# Patient Record
Sex: MALE | Race: ASIAN | NOT HISPANIC OR LATINO | URBAN - METROPOLITAN AREA
[De-identification: names, ages, dates, MRNs, and addresses within clinical notes are randomized per-mention and may not be internally consistent; named-entity substitution may affect disease eponyms.]

---

## 2021-05-24 ENCOUNTER — IMMUNIZATION (OUTPATIENT)
Dept: FAMILY MEDICINE | Facility: CLINIC | Age: 31
End: 2021-05-24
Payer: COMMERCIAL

## 2021-05-24 PROCEDURE — 0011A PR COVID VAC MODERNA 100 MCG/0.5 ML IM: CPT

## 2021-05-24 PROCEDURE — 91301 PR COVID VAC MODERNA 100 MCG/0.5 ML IM: CPT

## 2021-06-19 ENCOUNTER — HOSPITAL ENCOUNTER (EMERGENCY)
Dept: EMERGENCY MEDICINE | Facility: HOSPITAL | Age: 31
Discharge: HOME | End: 2021-06-19
Admitting: STUDENT IN AN ORGANIZED HEALTH CARE EDUCATION/TRAINING PROGRAM
Payer: COMMERCIAL

## 2021-06-19 DIAGNOSIS — L24.7 IRRITANT CONTACT DERMATITIS DUE TO PLANTS, EXCEPT FOOD: ICD-10-CM

## 2021-06-19 RX ORDER — CALAMINE 8% AND ZINC OXIDE 8% 160 MG/ML
LOTION TOPICAL
Qty: 120 ML | Refills: 0 | Status: SHIPPED | OUTPATIENT
Start: 2021-06-19 | End: 2023-04-06

## 2021-06-19 ASSESSMENT — MIFFLIN-ST. JEOR: SCORE: 1452.49

## 2021-06-26 NOTE — ED PROVIDER NOTES
Emergency Department Encounter   NAME: Harry Hewitt ; AGE: 31 y.o. male ; YOB: 1990 ; MRN: [id number] ; EVALUATION DATE & TIME: 6/19/2021 12:02 PM ; PCP: Provider, No Primary Care   ED PROVIDER: Erinn Owusu PA-C    Chief Complaint   Patient presents with     Rash       Medical Decision Making & Final Diagnosis     1. Irritant contact dermatitis due to plants, except food       Harry is a 32 yo previously healthy M who presents the ED for rash.  First noticed symptoms on Thursday.  Possible exposure with outdoor activities on Wednesday as he was cutting down things with his brother and wiping his face and chest with sweat.  Rash is itchy.    I considered multiple diagnoses including: Anaphylaxis, allergic reaction, poison oak, poison ivy, SJS or dress reaction, contact dermatitis, cellulitis, Ludewig's angina, and others    Exam: Notable for normal vital signs in a nontoxic appearing man.  Diffusely erythematous vesiculopapular rash to face, anterior neck, and chest.  There are 2 small areas on bilateral hands.  Periorbital facial edema also noted.  No intraoral or perioral edema.  No trismus.  Floor the mouth is supple.  No submandibular or submental edema or induration.  Clear lungs.  No stridor.  Impression: Contact dermatitis likely secondary to poison oak or poison ivy; patient presents with symptoms most consistent with a contact dermatitis from poison oak or poison ivy.  He was endorsing some difficulty breathing during the swelling in his neck but clinically he is not exhibiting any symptoms of this.  No stridor.  Posterior oropharynx without any abnormality, tonsillar hypertrophy, or tongue swelling.  Floor the mouth is soft.  No submental or submandibular swelling.  Skin is tense and edematous over the anterior portion of the neck but this is not extend to deeper tissues.  It is not circumferential.  It is noted on the monitor that the patient has been tachypneic several times while in the  emergency department I have evaluated him a few times while the monitor was reading this and he was resting and sleeping comfortably.  He had no wheezing breath noises.  He is not endorsing any other systemic symptoms suspicious for anaphylaxis.  He is not hypotensive or tachycardic here.   No indication for epinephrine.  Plan for Benadryl and calamine lotion to help with itching.  Steroids for swelling.  Unfortunately main pharmacy did not deliver the steroids for over an hour.  I followed up on this and discussed with pharmacist and primary nurse.  After patient received interventions he notes significant symptomatic improvement.  Upon my reevaluation his swelling and erythema has significantly decreased.  I do think he is appropriate for outpatient management at this point.  Nothing on exam to suggest cellulitis at this time.  No fever or pain with TTP.  No purulent drainage.  No systemic symptoms noted.  Low suspicion of just an allergic reaction.  Most consistent with contact dermatitis secondary to plant versus urticaria.  No new medications and exam not consistent with SJS or DRESS drug reaction.  No other systemic symptoms to suggest other etiology.  Plan: I do think this patient is appropriate for outpatient management follow-up with primary care provider.  He doesn't have a primary care provider so I referred him to wanting given information to follow-up.  I went over the results of the exam at the bedside.  Patient was given instructions for symptomatic care for home with steroids, calamine lotion, wound protection and infection prevention. We reviewed strict return precautions that would merit further evaluation in the emergency department.  Discussed the provisional nature of the diagnosis and need for close follow-up with pcp. We reviewed this could be contagious.  Patient was given adequate time to ask questions, received appropriate answers, and agrees with the plan as written.      ED Course   12:15  PM I met and introduced myself to the patient. I gathered initial history and performed my physical exam. We discussed plan for initial workup. Proper PPE worn while evaluating the patient in the room including N95 mask, glasses, and surgical cap.   1:30 PM rechecked the patient at the bedside.  Unfortunately, main pharmacy has not sent up any steroids more than an hour after they were ordered.  Benadryl and famotidine has been administered.  He is sleeping and resting comfortably at the bedside.  Monitor notes tachypnea but clinically he is in no evidence of respiratory distress or breathing quickly.  He notes slight improvement in symptoms.  I followed up with pharmacy to ensure the patient gets Solu-Medrol in a timely manner.  2:45 PM Pt significantly improved clinically.  He also reports improvement.  We discussed strict return precautions and management for home.    MEDICATIONS GIVEN IN THE EMERGENCY:   Medications   sodium chloride flush 10 mL (NS) (has no administration in time range)   diphenhydrAMINE injection 50 mg (BENADRYL) (50 mg Intravenous Given 6/19/21 1237)   famotidine 20 mg injection (20 mg Intravenous Given 6/19/21 1237)   methylPREDNISolone sod suc(PF) injection 80 mg (SOLU-MEDROL) (80 mg Intravenous Given 6/19/21 1338)   bacitracin ointment packet 1 packet (1 packet Topical Given 6/19/21 1450)   bacitracin ointment packet 1 packet (1 packet Topical Given 6/19/21 1450)   bacitracin ointment packet 1 packet (1 packet Topical Given 6/19/21 1450)      NEW PRESCRIPTIONS STARTED AT TODAY'S ER VISIT:  Discharge Medication List as of 6/19/2021  2:50 PM      START taking these medications    Details   calamine-zinc oxide topical lotion Apply topically for itching relief, Print      predniSONE (DELTASONE) 20 MG tablet Take 40 mg by mouth daily for 5 days., Starting Sat 6/19/2021, Until Thu 6/24/2021, Print            =================================================================   History   Patient  information was obtained from: patient   Use of Intrepreter: Yes (Phone) - Language Lalit Hewitt is a 31 y.o. male with no relevant PMH, who presents to the ED for evaluation of a rash.     Patient notes that he was helping his brother cut trees on Thursday 6/17/21 when he wiped his face and chest with his shirt and began developing a rash. The rash started on his face and stomach but has since radiated to his chest, arms, anterior and posterior neck. He states Thursday it was itchy but Friday (6/18/21) his body began to get swollen and the rash spread. He reports the itchy sensation is difficult to tolerate. He denies taking any medication but has tried a herbal remedy which he states helped initially but no longer helps. The last use was this morning. He denies any pain at this time. He states that due to the swollen area on his chest, he is having some difficulty breathing and a cough. He denies chest pain, nausea, vomiting, urinary symptoms, light-headedness, or any other complaints at this time.    Patient notes he has never had anything similar in the past or known allergies. He also denies any changes in diet, lotions, clothes, or any other changes.     ______________________________________________________________________  Relevant past medical history reviewed with patient, unless otherwise stated in HPI, history not pertinent to this visit.    Relevant past surgical history reviewed with patient, unless otherwise stated in HPI, history not pertinent to this visit.    Relevant family history reviewed with patient, unless otherwise stated in HPI, history not pertinent to this visit.    Social History     Tobacco Use     Smoking status: Not on file   Substance Use Topics     Alcohol use: Not on file     Drug use: Not on file       REVIEW OF SYSTEMS:    Review of Systems   Constitutional: Negative for chills and fever.   HENT: Negative for sore throat.    Eyes: Negative for visual disturbance.  "  Respiratory: Positive for cough. Negative for shortness of breath.         Positive for difficulty breathing   Cardiovascular: Negative for chest pain and leg swelling.   Gastrointestinal: Negative for abdominal pain, blood in stool, diarrhea, nausea and vomiting.   Genitourinary: Negative for discharge, dysuria and hematuria.   Skin: Positive for rash (Face, neck, abdomen, and bilateral arms. ).   Neurological: Negative for weakness, light-headedness and headaches.   All other systems reviewed and are negative.        Physical Exam   /65   Pulse (!) 54   Temp 97.9  F (36.6  C) (Oral)   Resp (!) 30   Ht 5' 3\" (1.6 m)   Wt 135 lb (61.2 kg)   SpO2 100%   BMI 23.91 kg/m      Physical Exam   Constitutional: He appears well-developed and well-nourished. No distress.   nontoxic appearing man.   HENT:   Head: Normocephalic and atraumatic.   Diffusely erythematous vesiculopapular rash to face, anterior neck, and chest.  There are 2 small areas on bilateral hands.  Periorbital facial edema also noted.  No intraoral or perioral edema.  No trismus.  Floor the mouth is supple.  No submandibular or submental edema or induration.   Eyes: Conjunctivae are normal.   Neck: Normal range of motion. Neck supple.   Cardiovascular: Normal rate, regular rhythm, normal heart sounds and intact distal pulses.   Pulmonary/Chest: Effort normal and breath sounds normal. No respiratory distress. He has no wheezes. He has no rales.   Clear lungs. No stridor.   Abdominal: Soft. He exhibits no distension. There is no abdominal tenderness. There is no rebound and no guarding.   Musculoskeletal: Normal range of motion.         General: No deformity or edema (bilateral lower extremities).   Neurological: He is alert.   Skin: Skin is warm and dry.   Psychiatric: He has a normal mood and affect. Judgment normal.   Nursing note and vitals reviewed.        Yumiko SHARMA, am serving as a scribe to document services personally performed by " Erinn Owusu PA-C, based on my observation and the provider's statements to me. I, Erinn Owusu PA-C attest that Yumiko Elizabeth is acting in a scribe capacity, has observed my performance of the services and has documented them in accordance with my direction.     Erinn Owusu PA-C   Emergency Medicine   [facility] EMERGENCY DEPARTMENT  [address]  [facility] [address]  Dept: [phone]  Loc: [phone]      Erinn Owusu PA-C  06/19/21 5719

## 2021-06-28 ENCOUNTER — IMMUNIZATION (OUTPATIENT)
Dept: FAMILY MEDICINE | Facility: CLINIC | Age: 31
End: 2021-06-28
Attending: FAMILY MEDICINE
Payer: COMMERCIAL

## 2021-06-28 PROCEDURE — 91301 PR COVID VAC MODERNA 100 MCG/0.5 ML IM: CPT

## 2021-06-28 PROCEDURE — 0012A PR COVID VAC MODERNA 100 MCG/0.5 ML IM: CPT

## 2021-07-06 VITALS — BODY MASS INDEX: 23.92 KG/M2 | WEIGHT: 135 LBS | HEIGHT: 63 IN

## 2023-01-13 ENCOUNTER — IMMUNIZATION (OUTPATIENT)
Dept: FAMILY MEDICINE | Facility: CLINIC | Age: 33
End: 2023-01-13
Payer: COMMERCIAL

## 2023-01-13 PROCEDURE — 91312 COVID-19 VACCINE BIVALENT BOOSTER 12+ (PFIZER): CPT

## 2023-01-13 PROCEDURE — 0124A COVID-19 VACCINE BIVALENT BOOSTER 12+ (PFIZER): CPT

## 2023-01-13 PROCEDURE — 99207 PR NO CHARGE LOS: CPT

## 2023-03-24 ENCOUNTER — OFFICE VISIT (OUTPATIENT)
Dept: FAMILY MEDICINE | Facility: CLINIC | Age: 33
End: 2023-03-24
Payer: COMMERCIAL

## 2023-03-24 VITALS
HEIGHT: 64 IN | TEMPERATURE: 98 F | HEART RATE: 77 BPM | BODY MASS INDEX: 21 KG/M2 | RESPIRATION RATE: 16 BRPM | OXYGEN SATURATION: 100 % | SYSTOLIC BLOOD PRESSURE: 100 MMHG | WEIGHT: 123 LBS | DIASTOLIC BLOOD PRESSURE: 67 MMHG

## 2023-03-24 DIAGNOSIS — R63.4 WEIGHT LOSS: Primary | ICD-10-CM

## 2023-03-24 LAB — HBA1C MFR BLD: 5.6 % (ref 0–5.6)

## 2023-03-24 PROCEDURE — 80053 COMPREHEN METABOLIC PANEL: CPT | Performed by: STUDENT IN AN ORGANIZED HEALTH CARE EDUCATION/TRAINING PROGRAM

## 2023-03-24 PROCEDURE — 99204 OFFICE O/P NEW MOD 45 MIN: CPT | Mod: GC | Performed by: STUDENT IN AN ORGANIZED HEALTH CARE EDUCATION/TRAINING PROGRAM

## 2023-03-24 PROCEDURE — 80061 LIPID PANEL: CPT | Performed by: STUDENT IN AN ORGANIZED HEALTH CARE EDUCATION/TRAINING PROGRAM

## 2023-03-24 PROCEDURE — 83036 HEMOGLOBIN GLYCOSYLATED A1C: CPT | Performed by: STUDENT IN AN ORGANIZED HEALTH CARE EDUCATION/TRAINING PROGRAM

## 2023-03-24 PROCEDURE — 36415 COLL VENOUS BLD VENIPUNCTURE: CPT | Performed by: STUDENT IN AN ORGANIZED HEALTH CARE EDUCATION/TRAINING PROGRAM

## 2023-03-24 PROCEDURE — 86803 HEPATITIS C AB TEST: CPT | Performed by: STUDENT IN AN ORGANIZED HEALTH CARE EDUCATION/TRAINING PROGRAM

## 2023-03-24 PROCEDURE — 87389 HIV-1 AG W/HIV-1&-2 AB AG IA: CPT | Performed by: STUDENT IN AN ORGANIZED HEALTH CARE EDUCATION/TRAINING PROGRAM

## 2023-03-24 PROCEDURE — 84443 ASSAY THYROID STIM HORMONE: CPT | Performed by: STUDENT IN AN ORGANIZED HEALTH CARE EDUCATION/TRAINING PROGRAM

## 2023-03-24 NOTE — PATIENT INSTRUCTIONS
Try eating more protein     We checked some labs.  Dr. Mace will review the abnormal labs with you.     Your weight is healthy! Keep up the healthy diet!

## 2023-03-24 NOTE — LETTER
March 31, 2023      Harryanurag Hewitt  [address] [place] [address]        Dear ,    We are writing to inform you of your test results.    Dear Harry,     These are the results of your blood work, which are all within normal limits.  As we discussed at our clinic visit, I am sending them for your records.  You have an appointment with your primary care doctor on April 6.  If you have any questions at that time, please bring them to that appointment.     Sincerely,   Dr. Jc       Resulted Orders   TSH with free T4 reflex   Result Value Ref Range    TSH 1.27 0.30 - 4.20 uIU/mL   Comprehensive metabolic panel   Result Value Ref Range    Sodium 140 136 - 145 mmol/L    Potassium 4.2 3.4 - 5.3 mmol/L    Chloride 104 98 - 107 mmol/L    Carbon Dioxide (CO2) 21 (L) 22 - 29 mmol/L    Anion Gap 15 7 - 15 mmol/L    Urea Nitrogen 18.1 6.0 - 20.0 mg/dL    Creatinine 0.92 0.67 - 1.17 mg/dL    Calcium 8.8 8.6 - 10.0 mg/dL    Glucose 102 (H) 70 - 99 mg/dL    Alkaline Phosphatase 69 40 - 129 U/L    AST 34 10 - 50 U/L    ALT 34 10 - 50 U/L    Protein Total 7.3 6.4 - 8.3 g/dL    Albumin 4.3 3.5 - 5.2 g/dL    Bilirubin Total 0.3 <=1.2 mg/dL    GFR Estimate >90 >60 mL/min/1.73m2      Comment:      eGFR calculated using 2021 CKD-EPI equation.   HIV Ag/Ab Screen [place]   Result Value Ref Range    HIV Antigen Antibody Combo Nonreactive Nonreactive      Comment:      HIV-1 p24 Ag & HIV-1/HIV-2 Ab Not Detected   Hepatitis C Screen Reflex to HCV RNA Quant and Genotype   Result Value Ref Range    Hepatitis C Antibody Nonreactive Nonreactive    Narrative    Assay performance characteristics have not been established for newborns, infants, and children.   Lipid panel reflex to direct LDL Fasting   Result Value Ref Range    Cholesterol 153 <200 mg/dL    Triglycerides 114 <150 mg/dL    Direct Measure HDL 53 >=40 mg/dL    LDL Cholesterol Calculated 77 <=100 mg/dL    Non HDL Cholesterol 100 <130 mg/dL    Narrative    Cholesterol  Desirable:   <200 mg/dL    Triglycerides  Normal:  Less than 150 mg/dL  Borderline High:  150-199 mg/dL  High:  200-499 mg/dL  Very High:  Greater than or equal to 500 mg/dL    Direct Measure HDL  Female:  Greater than or equal to 50 mg/dL   Male:  Greater than or equal to 40 mg/dL    LDL Cholesterol  Desirable:  <100mg/dL  Above Desirable:  100-129 mg/dL   Borderline High:  130-159 mg/dL   High:  160-189 mg/dL   Very High:  >= 190 mg/dL    Non HDL Cholesterol  Desirable:  130 mg/dL  Above Desirable:  130-159 mg/dL  Borderline High:  160-189 mg/dL  High:  190-219 mg/dL  Very High:  Greater than or equal to 220 mg/dL   Hemoglobin A1c   Result Value Ref Range    Hemoglobin A1C 5.6 0.0 - 5.6 %      Comment:      Normal <5.7%   Prediabetes 5.7-6.4%    Diabetes 6.5% or higher     Note: Adopted from ADA consensus guidelines.       If you have any questions or concerns, please call the clinic at the number listed above.       Sincerely,      Yolanda Polanco MD

## 2023-03-24 NOTE — PROGRESS NOTES
"Preceptor Attestation:  Vitals:    03/24/23 1130   BP: 100/67   BP Location: Left arm   Patient Position: Sitting   Cuff Size: Adult Regular   Pulse: 77   Resp: 16   Temp: 98  F (36.7  C)   TempSrc: Oral   SpO2: 100%   Weight: 55.8 kg (123 lb)   Height: 1.631 m (5' 4.21\")          I discussed the patient with the resident and evaluated the patient in person. I have verified the content of the note, which accurately reflects my assessment of the patient and the plan of care.   Supervising Physician:  Yolanda Polanco MD    "

## 2023-03-25 LAB
ALBUMIN SERPL BCG-MCNC: 4.3 G/DL (ref 3.5–5.2)
ALP SERPL-CCNC: 69 U/L (ref 40–129)
ALT SERPL W P-5'-P-CCNC: 34 U/L (ref 10–50)
ANION GAP SERPL CALCULATED.3IONS-SCNC: 15 MMOL/L (ref 7–15)
AST SERPL W P-5'-P-CCNC: 34 U/L (ref 10–50)
BILIRUB SERPL-MCNC: 0.3 MG/DL
BUN SERPL-MCNC: 18.1 MG/DL (ref 6–20)
CALCIUM SERPL-MCNC: 8.8 MG/DL (ref 8.6–10)
CHLORIDE SERPL-SCNC: 104 MMOL/L (ref 98–107)
CHOLEST SERPL-MCNC: 153 MG/DL
CREAT SERPL-MCNC: 0.92 MG/DL (ref 0.67–1.17)
DEPRECATED HCO3 PLAS-SCNC: 21 MMOL/L (ref 22–29)
GFR SERPL CREATININE-BSD FRML MDRD: >90 ML/MIN/1.73M2
GLUCOSE SERPL-MCNC: 102 MG/DL (ref 70–99)
HCV AB SERPL QL IA: NONREACTIVE
HDLC SERPL-MCNC: 53 MG/DL
HIV 1+2 AB+HIV1 P24 AG SERPL QL IA: NONREACTIVE
LDLC SERPL CALC-MCNC: 77 MG/DL
NONHDLC SERPL-MCNC: 100 MG/DL
POTASSIUM SERPL-SCNC: 4.2 MMOL/L (ref 3.4–5.3)
PROT SERPL-MCNC: 7.3 G/DL (ref 6.4–8.3)
SODIUM SERPL-SCNC: 140 MMOL/L (ref 136–145)
TRIGL SERPL-MCNC: 114 MG/DL
TSH SERPL DL<=0.005 MIU/L-ACNC: 1.27 UIU/ML (ref 0.3–4.2)

## 2023-04-03 NOTE — PROGRESS NOTES
Assessment & Plan   Encounter Date: Mar 24, 2023    Weight loss  Presented with concern about not being able to gain weight.  On review, his diet is relatively healthy.  He does not eat or want to eat fatty foods.  He works at a pretty active job.  Exam is unrevealing.  Laboratory work-up is also unrevealing.  I discussed this with his wife, who initiated today's visit.  Reassurance given.  Patient felt comfortable with discharge and following up with PCP for a routine preventive care visit.  - TSH with free T4 reflex; Future  - Comprehensive metabolic panel; Future  - HIV Ag/Ab Screen [place]; Future  - Hepatitis C Screen Reflex to HCV RNA Quant and Genotype; Future  - Lipid panel reflex to direct LDL Fasting; Future  - Hemoglobin A1c; Future  - TSH with free T4 reflex  - Comprehensive metabolic panel  - HIV Ag/Ab Screen [place]  - Hepatitis C Screen Reflex to HCV RNA Quant and Genotype  - Lipid panel reflex to direct LDL Fasting  - Hemoglobin A1c          Follow-Up:   As needed   Annual preventive visit    Brenda Jc MD PGY-3   [facility]   Staffed with Dr. Polanco.        ______________________________________________________    Subjective     HPI:  Mr. Harry Hewitt is a(n) 33 year old male who presents today as a new patient for:  Weight Problem (PT concerned with inability to gain weight even though eating well, poor appetite at times. )    No night sweats  Last weight 135 lbs in 2021.  123 lbs today.  Works as a .   Eats vegetables, meat, rice.  Not a lot of fat.  No stomach aches  No rashes  No palpitations  No hair thinning or brittle nails  His wife is concerned that he goes to the bathroom to have a bowel movement after his large meals.    He feels well otherwise        Preventive Care:  HCV, HIV screenings done today    Patient is otherwise feeling well, without additional concerns.    An in-person [employer]  was used for this visit.    Data Reviewed:    No  "results found for any previous visit.     No results found.    Medications:  Current Outpatient Medications   Medication     calamine-zinc oxide topical lotion     No current facility-administered medications for this visit.      Past Medical History:   There is no problem list on file for this patient.    No past medical history on file.           Objective     /67 (BP Location: Left arm, Patient Position: Sitting, Cuff Size: Adult Regular)   Pulse 77   Temp 98  F (36.7  C) (Oral)   Resp 16   Ht 1.631 m (5' 4.21\")   Wt 55.8 kg (123 lb)   SpO2 100%   BMI 20.97 kg/m      Pertinent Physical Exam Findings    Gen: Pleasant male in no acute distress.  Appears stated age.  Casually groomed.   HEENT: Dentition is in fair repair.  Tonsils are not enlarged.  Thyroid is normal to palpation.  Respiratory: CTA B in posterior lung fields bilaterally  CV: Regular rate and rhythm  Abdomen: Flat, nondistended, nontender.  No scars.  No rebound tenderness or guarding.  Skin: No rashes on exposed skin    No results found for this or any previous visit (from the past 24 hour(s)).   Latest Reference Range & Units 03/24/23 12:27   Sodium 136 - 145 mmol/L 140   Potassium 3.4 - 5.3 mmol/L 4.2   Chloride 98 - 107 mmol/L 104   Carbon Dioxide (CO2) 22 - 29 mmol/L 21 (L)   Urea Nitrogen 6.0 - 20.0 mg/dL 18.1   Creatinine 0.67 - 1.17 mg/dL 0.92   GFR Estimate >60 mL/min/1.73m2 >90   Calcium 8.6 - 10.0 mg/dL 8.8   Anion Gap 7 - 15 mmol/L 15   Albumin 3.5 - 5.2 g/dL 4.3   Protein Total 6.4 - 8.3 g/dL 7.3   Alkaline Phosphatase 40 - 129 U/L 69   ALT 10 - 50 U/L 34   AST 10 - 50 U/L 34   Bilirubin Total <=1.2 mg/dL 0.3   Cholesterol <200 mg/dL 153   Glucose 70 - 99 mg/dL 102 (H)   HDL Cholesterol >=40 mg/dL 53   Hemoglobin A1C 0.0 - 5.6 % 5.6   LDL Cholesterol Calculated <=100 mg/dL 77   Non HDL Cholesterol <130 mg/dL 100   Triglycerides <150 mg/dL 114   TSH 0.30 - 4.20 uIU/mL 1.27   Hepatitis C Antibody Nonreactive  Nonreactive   HIV " Antigen Antibody Combo Nonreactive  Nonreactive   (L): Data is abnormally low  (H): Data is abnormally high           ----- Service Performed and Documented by Resident or Fellow ------         03-Apr-2023 11:04 15-Nov-2019 06:44

## 2023-04-04 ENCOUNTER — HOSPITAL ENCOUNTER (EMERGENCY)
Facility: HOSPITAL | Age: 33
Discharge: HOME | End: 2023-04-04
Attending: EMERGENCY MEDICINE | Admitting: EMERGENCY MEDICINE
Payer: COMMERCIAL

## 2023-04-04 VITALS
BODY MASS INDEX: 21.79 KG/M2 | WEIGHT: 123 LBS | RESPIRATION RATE: 20 BRPM | OXYGEN SATURATION: 98 % | SYSTOLIC BLOOD PRESSURE: 123 MMHG | DIASTOLIC BLOOD PRESSURE: 72 MMHG | HEIGHT: 63 IN | HEART RATE: 52 BPM | TEMPERATURE: 97.7 F

## 2023-04-04 DIAGNOSIS — S01.111A LACERATION OF RIGHT EYEBROW, INITIAL ENCOUNTER: ICD-10-CM

## 2023-04-04 PROCEDURE — 99283 EMERGENCY DEPT VISIT LOW MDM: CPT | Mod: 25

## 2023-04-04 PROCEDURE — 90471 IMMUNIZATION ADMIN: CPT | Performed by: EMERGENCY MEDICINE

## 2023-04-04 PROCEDURE — 250N000011 HC RX IP 250 OP 636: Performed by: EMERGENCY MEDICINE

## 2023-04-04 PROCEDURE — 12013 RPR F/E/E/N/L/M 2.6-5.0 CM: CPT

## 2023-04-04 PROCEDURE — 90715 TDAP VACCINE 7 YRS/> IM: CPT | Performed by: EMERGENCY MEDICINE

## 2023-04-04 RX ADMIN — CLOSTRIDIUM TETANI TOXOID ANTIGEN (FORMALDEHYDE INACTIVATED), CORYNEBACTERIUM DIPHTHERIAE TOXOID ANTIGEN (FORMALDEHYDE INACTIVATED), BORDETELLA PERTUSSIS TOXOID ANTIGEN (GLUTARALDEHYDE INACTIVATED), BORDETELLA PERTUSSIS FILAMENTOUS HEMAGGLUTININ ANTIGEN (FORMALDEHYDE INACTIVATED), BORDETELLA PERTUSSIS PERTACTIN ANTIGEN, AND BORDETELLA PERTUSSIS FIMBRIAE 2/3 ANTIGEN 0.5 ML: 5; 2; 2.5; 5; 3; 5 INJECTION, SUSPENSION INTRAMUSCULAR at 04:50

## 2023-04-04 ASSESSMENT — ACTIVITIES OF DAILY LIVING (ADL): ADLS_ACUITY_SCORE: 35

## 2023-04-04 NOTE — DISCHARGE INSTRUCTIONS
You were seen in the emergency department for head injury and laceration.  We repaired a laceration to your right eyebrow.  There are 8 stitches which will need to be removed, we would recommend having these taken out in 7 days.  This can be done in your clinic or at any urgent care.  Please keep the area clean.  It is okay to shower and gently rinse the area with water.  Monitor for any signs of infection and return if there are any immediate concerns like severe redness or drainage or any other concerns like severe headaches, confusion, weakness.

## 2023-04-04 NOTE — ED PROVIDER NOTES
EMERGENCY DEPARTMENT ENCOUNTER      NAME: Harry Hewitt  AGE: 33 year old male  YOB: 1990  MRN: [id number]  EVALUATION DATE & TIME: 2023  4:18 AM    PCP: Rodolfo Mace    ED PROVIDER: Igor Langford M.D.      Chief Complaint   Patient presents with     Laceration         FINAL IMPRESSION:  1. Laceration of right eyebrow, initial encounter          ED COURSE & MEDICAL DECISION MAKIN year old male presents to the Emergency Department for evaluation of right forehead laceration.  Patient is vitally stable, nontoxic-appearing, neurologically intact when he arrives to the emergency department.  He had a mechanical fall and injured his right brow.  He has about a 4 cm laceration extending directly through the right eyebrow.  Extraocular movements are intact.  No evidence of depressed skull fracture and seems fairly low risk from a head injury standpoint with no severe headache, vision changes, neurodeficits, or loss of consciousness.  Discussed but at this point would hold off on CT head given his stable exam.  His laceration was anesthetized, irrigated extensively, and repaired, see procedure note below.  Tetanus was updated.  We discussed follow-up for wound check and suture removal.  Discussed return precautions related to his head injury.  Patient was discharged in good condition    4:25 AM I met the patient and performed my initial interview and exam.   4:51 AM I performed laceration repair procedure. See procedure note below.  5:08 AM We discussed the plan for discharge and the patient is agreeable. Reviewed supportive cares, symptomatic treatment, outpatient follow up, and reasons to return to the Emergency Department.    At the conclusion of the encounter I discussed the results of all of the tests and the disposition. The questions were answered. The patient or family acknowledged understanding and was agreeable with the care plan.       Medical Decision  Making    History:    Supplemental history from: Family Member/Significant Other    External Record(s) reviewed: Documented in chart, if applicable.    Work Up:    Chart documentation includes differential considered and any EKGs or imaging independently interpreted by provider, where specified.    In additional to work up documented, I considered the following work up: Documented in chart, if applicable.    External consultation:    Discussion of management with another provider: Documented in chart, if applicable    Complicating factors:    Care impacted by chronic illness: N/A    Care affected by social determinants of health: N/A    Disposition considerations: Discharge. No recommendations on prescription strength medication(s). See documentation for any additional details.            MEDICATIONS GIVEN IN THE EMERGENCY:  Medications   Tdap (tetanus-diphtheria-acell pertussis) (ADACEL) injection 0.5 mL (0.5 mLs Intramuscular $Given 4/4/23 6490)       NEW PRESCRIPTIONS STARTED AT TODAY'S ER VISIT  Discharge Medication List as of 4/4/2023  5:15 AM             =================================================================    HPI    Patient information was obtained from: patient and his significant other    Use of : N/A         Harry Hewitt is a 33 year old male with no pertinent history on file who presents to this ED by walk-in for evaluation of laceration.    The patient's significant other reports the patient got up to use the bathroom at ~0330 this morning and slipped on the wet floor. She states he fell forward and hit his head, sustaining a laceration to his medial right eyebrow. Bleeding is controlled. The patient endorses 8/10 pain to his laceration site. He denies any loss of consciousness. He denies any other injuries from his fall. Denies any neck pain. He us not on any blood thinners. Patient denies additional medical concerns or complaints at this time.      Per chart review, patient's last  "tetanus shot was 2007 and is therefore out of date.      REVIEW OF SYSTEMS   All systems reviewed and negative except as noted in HPI.    PAST MEDICAL HISTORY:  No past medical history on file.    PAST SURGICAL HISTORY:  No past surgical history on file.        CURRENT MEDICATIONS:    No current facility-administered medications for this encounter.     Current Outpatient Medications   Medication     calamine-zinc oxide topical lotion         ALLERGIES:  No Known Allergies    FAMILY HISTORY:  No family history on file.    SOCIAL HISTORY:   Social History     Socioeconomic History     Marital status:    Tobacco Use     Smoking status: Never     Passive exposure: Never     Smokeless tobacco: Never       VITALS:  /72   Pulse 52   Temp 97.7  F (36.5  C)   Resp 20   Ht 1.6 m (5' 3\")   Wt 55.8 kg (123 lb)   SpO2 98%   BMI 21.79 kg/m      PHYSICAL EXAM    Constitutional: Well developed, Well nourished, NAD.  HENT: There is a 4 cm laceration to the right frontal scalp extending directly through the right eyebrow.  Extraocular movements are intact with no limitations.  No palpable skull defects or deformities.  Laceration does not involve the galea.  Head is otherwise atraumatic.  No midline cervical spine tenderness.  Ranging neck with no discomfort.  Eyes: EOMI, Conjunctiva normal.  Respiratory: Breathing comfortably on room air. Speaks full sentences easily. Lungs clear to ascultation.  Cardiovascular: Normal heart rate, Regular rhythm. No peripheral edema.  Abdomen: Soft  Musculoskeletal: Good range of motion in all major joints. No major deformities noted.  Integument: Warm, Dry.  Neurologic: Alert & awake, Normal motor function, Normal sensory function, No focal deficits noted.   Psychiatric: Cooperative. Affect appropriate.       PROCEDURES:     PROCEDURE: Laceration Repair   INDICATIONS: Laceration   PROCEDURE PROVIDER: Dr Daniele Langford   SITE: R eyebrow   TYPE/SIZE: simple, clean and no foreign " body visualized  4 cm (total length)   FUNCTIONAL ASSESSMENT: Distal sensation, circulation and motor intact   MEDICATION: 5 mLs of 1% Lidocaine with epinephrine   PREPARATION: irrigation with Normal saline   DEBRIDEMENT: no debridement   CLOSURE:  Superficial layer closed with 8 stitches of 5-0 Ethilon simple interrupted    Total number of sutures/staples placed: 8         I, Cookie Cooper, am serving as a scribe to document services personally performed by Dr. Igor Langford, based on my observation and the provider's statements to me. I, Igor Langford MD attest that Cookie Cooper is acting in a scribe capacity, has observed my performance of the services and has documented them in accordance with my direction.    Igor Langford M.D.  Emergency Medicine  [facility] EMERGENCY DEPARTMENT  [address] [place] [id number]  [phone]  Dept: [phone]     Igor Langford MD  04/04/23 0657

## 2023-04-04 NOTE — ED TRIAGE NOTES
Patient got up to use bathroom, slipped on wet floor and fell, splitting open right eyebrow. About a 2 inch LAC. No bleeding. He denies thinners or LOC. Denies syncope. Pain an 8. Denies neck pain. A&O, ambulates, VSS. Unknown Tetanus status.     Triage Assessment     Row Name 04/04/23 0412       Triage Assessment (Adult)    Airway WDL WDL       Respiratory WDL    Respiratory WDL WDL       Skin Circulation/Temperature WDL    Skin Circulation/Temperature WDL WDL       Cardiac WDL    Cardiac WDL WDL       Peripheral/Neurovascular WDL    Peripheral Neurovascular WDL WDL

## 2023-04-06 ENCOUNTER — OFFICE VISIT (OUTPATIENT)
Dept: FAMILY MEDICINE | Facility: CLINIC | Age: 33
End: 2023-04-06
Payer: COMMERCIAL

## 2023-04-06 VITALS
HEIGHT: 65 IN | SYSTOLIC BLOOD PRESSURE: 102 MMHG | RESPIRATION RATE: 20 BRPM | OXYGEN SATURATION: 98 % | HEART RATE: 69 BPM | TEMPERATURE: 98.1 F | DIASTOLIC BLOOD PRESSURE: 66 MMHG | WEIGHT: 126.1 LBS | BODY MASS INDEX: 21.01 KG/M2

## 2023-04-06 DIAGNOSIS — Z00.00 ROUTINE GENERAL MEDICAL EXAMINATION AT A HEALTH CARE FACILITY: Primary | ICD-10-CM

## 2023-04-06 DIAGNOSIS — Z87.891 FORMER SMOKER: ICD-10-CM

## 2023-04-06 PROCEDURE — 99395 PREV VISIT EST AGE 18-39: CPT | Mod: GC | Performed by: STUDENT IN AN ORGANIZED HEALTH CARE EDUCATION/TRAINING PROGRAM

## 2023-04-06 NOTE — PROGRESS NOTES
SUBJECTIVE:   CC: Harry is an 33 year old who presents for preventative health visit.       4/6/2023    11:06 AM   Additional Questions   Roomed by helga   Accompanied by girlfriend     HPI      Today's PHQ-2 Score:       3/24/2023    11:29 AM   PHQ-2 ( 1999 [employer])   Q1: Little interest or pleasure in doing things 0   Q2: Feeling down, depressed or hopeless 0   PHQ-2 Score 0       Have you ever done Advance Care Planning? (For example, a Health Directive, POLST, or a discussion with a medical provider or your loved ones about your wishes): No, advance care planning information given to patient to review.  Patient plans to discuss their wishes with loved ones or provider.      Social History     Tobacco Use     Smoking status: Former     Packs/day: 0.25     Years: 10.00     Pack years: 2.50     Types: Cigarettes     Passive exposure: Never     Smokeless tobacco: Never   Vaping Use     Vaping status: Not on file   Substance Use Topics     Alcohol use: Yes     Alcohol/week: 2.0 standard drinks of alcohol     Types: 2 Cans of beer per week            View : No data to display.                Last PSA: No results found for: PSA    Reviewed orders with patient. Reviewed health maintenance and updated orders accordingly - Yes    Reviewed and updated as needed this visit by clinical staff   Tobacco  Allergies  Meds              Reviewed and updated as needed this visit by Provider                   Review of Systems  CONSTITUTIONAL: NEGATIVE for fever, chills, change in weight  INTEGUMENTARY/SKIN: NEGATIVE for worrisome rashes, moles or lesions  EYES: NEGATIVE for vision changes or irritation  ENT: NEGATIVE for ear, mouth and throat problems  RESP: NEGATIVE for significant cough or SOB  CV: NEGATIVE for chest pain, palpitations or peripheral edema  GI: NEGATIVE for nausea, abdominal pain, heartburn, or change in bowel habits   male: negative for dysuria, hematuria, decreased urinary stream, erectile dysfunction, urethral  "discharge  MUSCULOSKELETAL: NEGATIVE for significant arthralgias or myalgia  NEURO: NEGATIVE for weakness, dizziness or paresthesias  PSYCHIATRIC: NEGATIVE for changes in mood or affect    OBJECTIVE:   /66 (BP Location: Left arm, Patient Position: Sitting, Cuff Size: Adult Regular)   Pulse 69   Temp 98.1  F (36.7  C) (Oral)   Resp 20   Ht 1.643 m (5' 4.69\")   Wt 57.2 kg (126 lb 1.6 oz)   SpO2 98%   BMI 21.19 kg/m      Physical Exam  GENERAL: healthy, alert and no distress  EYES: Eyes grossly normal to inspection, PERRL and conjunctivae and sclerae normal  HENT: ear canals and TM's normal, nose and mouth without ulcers or lesions  NECK: no adenopathy, no asymmetry, masses, or scars and thyroid normal to palpation  RESP: lungs clear to auscultation - no rales, rhonchi or wheezes  CV: regular rate and rhythm, normal S1 S2, no S3 or S4, no murmur, click or rub, no peripheral edema and peripheral pulses strong  ABDOMEN: soft, nontender, no hepatosplenomegaly, no masses and bowel sounds normal   (male): declined per patient preference  MS: no gross musculoskeletal defects noted, no edema  SKIN: laceration of left eyebrow  PSYCH: mentation appears normal, affect normal/bright    Labs reviewed in Epic    ASSESSMENT/PLAN:   Harry was seen today for physical.    Diagnoses and all orders for this visit:    Routine general medical examination at a health care facility  Overall healthy 33 year old patient. UTD on health maintenance: Normal CMP, A1c, lipid, hepatitis C, HIV, TSH on 3/20/2023.  Other counseling as documented below.          COUNSELING:   Reviewed preventive health counseling, as reflected in patient instructions       Regular exercise       Healthy diet/nutrition       Vision screening       Immunizations    Declined: Hepatitis B due to Concerns about side effects/safety           Advance Care Planning      He reports that he has quit smoking. His smoking use included cigarettes. He has a 2.50 " pack-year smoking history. He has never been exposed to tobacco smoke. He has never used smokeless tobacco.        Patient staffed with attending provider Dr. Hansen.    Rodolfo Mace MD  [facility]

## 2023-04-06 NOTE — PROGRESS NOTES
Preceptor Attestation:   Patient seen, evaluated and discussed with the resident. I have verified the content of the note, which accurately reflects my assessment of the patient and the plan of care.   Supervising Physician:  Burke Hansen MD

## 2023-04-09 PROBLEM — Z87.891 FORMER SMOKER: Status: ACTIVE | Noted: 2023-04-09
